# Patient Record
Sex: FEMALE | Race: OTHER
[De-identification: names, ages, dates, MRNs, and addresses within clinical notes are randomized per-mention and may not be internally consistent; named-entity substitution may affect disease eponyms.]

---

## 2021-02-25 ENCOUNTER — HOSPITAL ENCOUNTER (EMERGENCY)
Dept: HOSPITAL 63 - ER | Age: 21
LOS: 1 days | Discharge: HOME | End: 2021-02-26
Payer: OTHER GOVERNMENT

## 2021-02-25 VITALS
DIASTOLIC BLOOD PRESSURE: 90 MMHG | SYSTOLIC BLOOD PRESSURE: 118 MMHG | DIASTOLIC BLOOD PRESSURE: 90 MMHG | SYSTOLIC BLOOD PRESSURE: 118 MMHG

## 2021-02-25 VITALS — BODY MASS INDEX: 22.21 KG/M2 | HEIGHT: 64 IN | WEIGHT: 130.07 LBS

## 2021-02-25 DIAGNOSIS — R60.0: ICD-10-CM

## 2021-02-25 DIAGNOSIS — Z88.8: ICD-10-CM
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
## 2021-02-25 DIAGNOSIS — S82.491A: Primary | ICD-10-CM
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
## 2021-02-25 PROCEDURE — 73590 X-RAY EXAM OF LOWER LEG: CPT

## 2021-02-25 PROCEDURE — 29505 APPLICATION LONG LEG SPLINT: CPT

## 2021-02-25 PROCEDURE — 99284 EMERGENCY DEPT VISIT MOD MDM: CPT

## 2021-02-25 PROCEDURE — 73610 X-RAY EXAM OF ANKLE: CPT

## 2021-02-25 NOTE — RAD
Study: 

1. XR EXAM OF ANKLE_RIGHT 3VIEWS

2. CR XR Rt TIBIA+FIBULA 2 VIEWS



Indication: Ankle pain.



Comparison: None.



Findings:



AP, AP oblique and lateral views of the right ankle as well as an AP and lateral view of the right ti
mayda/fibula are obtained.



Acute spiral fracture of the distal tibial diaphysis with up to a cortical width displacement. Nondis
placed oblique fracture of the distal fibula with the caudal margin of the fracture at the level of t
he joint space. The ankle mortise is symmetric. The visualized foot osseous structures are intact. No
 acute fracture seen at the proximal tibia or fibula.



Edematous lower leg/ankle soft tissues.



Impression:



Acute spiral fracture with minimal displacement at the distal tibial diaphysis as well as an acute no
ndisplaced distal fibular fracture. No traumatic malalignment at the ankle. No acute fracture at the 
more proximal tibia or fibula.



Electronically signed by: PERLA SOUTH MD (2/25/2021 11:26 PM) [facility]

## 2021-02-25 NOTE — RAD
Study: 

1. XR EXAM OF ANKLE_RIGHT 3VIEWS

2. CR XR Rt TIBIA+FIBULA 2 VIEWS



Indication: Ankle pain.



Comparison: None.



Findings:



AP, AP oblique and lateral views of the right ankle as well as an AP and lateral view of the right ti
mayda/fibula are obtained.



Acute spiral fracture of the distal tibial diaphysis with up to a cortical width displacement. Nondis
placed oblique fracture of the distal fibula with the caudal margin of the fracture at the level of t
he joint space. The ankle mortise is symmetric. The visualized foot osseous structures are intact. No
 acute fracture seen at the proximal tibia or fibula.



Edematous lower leg/ankle soft tissues.



Impression:



Acute spiral fracture with minimal displacement at the distal tibial diaphysis as well as an acute no
ndisplaced distal fibular fracture. No traumatic malalignment at the ankle. No acute fracture at the 
more proximal tibia or fibula.



Electronically signed by: PERLA SOUTH MD (2/25/2021 11:42 PM) [facility]

## 2021-02-25 NOTE — PHYS DOC
Past History


Past Medical History:  No Pertinent History


Past Surgical History:  No Surgical History


Additional Smoking Information:  


VAPE


Alcohol Use:  Rarely





Adult General


Chief Complaint


Chief Complaint:  ANKLE PROBLEM





HPI


HPI


Patient is a 20-year-old female who presents with right ankle pain.  States she 

was rollerblading, and just finished and twisted her ankle.  States she has 

pain, 6 out of 10, dull and achy in nature with some mild swelling on the right.

 Denies any other injuries.





Review of Systems


Review of Systems


Review of systems otherwise unremarkable except noted in HPI





Allergies


Allergies





Allergies








Uncoded Allergies Type Severity Reaction Last Updated Verified


 


  COLD Allergy Unknown  2/25/21 











Physical Exam


Physical Exam





Constitutional: Well developed, well nourished, no acute distress, non-toxic a

ppearance. []


Skin: Warm, dry, no erythema, no rash. [] 


Back: No tenderness,


Extremities: Right


Neurologic: Alert and oriented X 3, normal motor function, normal sensory f

unction, no focal deficits noted. []


Psychologic: Affect normal, judgement normal, mood normal. []





Current Patient Data


Vital Signs





                                   Vital Signs








  Date Time  Temp Pulse Resp B/P (MAP) Pulse Ox O2 Delivery O2 Flow Rate FiO2


 


2/25/21 21:31 99.0 109 22 118/90 (99) 100 Room Air  











EKG


EKG


[]





Radiology/Procedures


Radiology/Procedures


[]Findings:





AP, AP oblique and lateral views of the right ankle as well as an AP and lateral

 view of the right tibia/fibula are obtained.





Acute spiral fracture of the distal tibial diaphysis with up to a cortical width

 displacement. Nondisplaced oblique fracture of the distal fibula with the 

caudal margin of the fracture at the level of the joint space. The ankle mortise

 is symmetric. The visualized foot osseous structures are intact. No acute 

fracture seen at the proximal tibia or fibula.





Edematous lower leg/ankle soft tissues.





Impression:





Acute spiral fracture with minimal displacement at the distal tibial diaphysis 

as well as an acute nondisplaced distal fibular fracture. No traumatic 

malalignment at the ankle. No acute fracture at the more proximal tibia or 

fibula.





Electronically signed by: PERLA SOUTH MD (2/25/2021 11:42 PM) [facility]





Heart Score


Risk Factors:


Risk Factors:  DM, Current or recent (<one month) smoker, HTN, HLP, family 

history of CAD, obesity.


Risk Scores:


Risk Factors:  DM, Current or recent (<one month) smoker, HTN, HLP, family 

history of CAD, obesity.





Course & Med Decision Making


Course & Med Decision Making


Patient is a 20-year-old female presents with right ankle pain


Vital signs not concerning.  Physical exam noted above.  Tylenol, ibuprofen and 

ice given.


Imaging notable for acute spiral fracture with minimal displacement at the 

distal tibial diaphysis and acute nondisplaced distal fibular fracture.  Normal 

ankle.  Discussed patient's fracture with orthopedic surgeon Dr. Hoffman at 

[place].  [place] since patients pain is controlled and was neurovascularly 

intact a posterior long-leg splint was appropriate.  Advised that she should 

follow-up in their clinic next Tuesday when he is in.


Patient placed in a posterior long-leg splint.  Given oral pain medication in 

the ED.  Given crutches.  Gave strict return precautions to the ED.  Gave 

contact information for [place] [facility] and advised to call first 

thing Monday morning to set up an appointment for Tuesday to see Dr. Hoffman.  

Patient grateful, verbalized understanding and agreed with plan of discharge.


[]





Dragon Disclaimer


Dragon Disclaimer


This electronic medical record was generated, in whole or in part, using a voice

 recognition dictation system.





Departure


Departure:


Impression:  


   Primary Impression:  


   Tibial fracture


   Additional Impression:  


   Fibula fracture


Disposition:  01 DC HOME SELF CARE/HOMELESS


Condition:  IMPROVED


Referrals:  


MONIQUE GREENFIELD DO (PCP)


Patient Instructions:  Tibial and Fibular Fracture, Adult





Additional Instructions:  


Please read all the attached information.  Please use your crutches at all times

 when walking.  Please keep your splint on.  You can use Tylenol and ice at home

 as needed for pain.  Please use your prescription pain medicine for 

breakthrough pain.





Please call the [place] [facility] at [phone] first thing Monday 

morning to set up an appointment to see Dr. Hoffman in his clinic on Tuesday.





Please come back to the emergency department with any new or concerning symptoms

 as discussed.


Scripts


Hydrocodone Bit/Acetaminophen (HYDROCODONE-APAP 5-325  **) 1 Each Tablet


1 TAB PO PRN Q6HRS PRN for fracture for 6 Days, #24 TAB 0 Refills


   Prov: DILAN REED MD         2/26/21





Problem Qualifiers











DILAN REED MD               Feb 25, 2021 21:57

## 2021-03-04 ENCOUNTER — HOSPITAL ENCOUNTER (OUTPATIENT)
Dept: HOSPITAL 61 - LAB | Age: 21
End: 2021-03-04
Attending: ORTHOPAEDIC SURGERY
Payer: OTHER GOVERNMENT

## 2021-03-04 DIAGNOSIS — Z01.812: Primary | ICD-10-CM

## 2021-03-04 DIAGNOSIS — Z20.822: ICD-10-CM

## 2021-03-04 PROCEDURE — U0003 INFECTIOUS AGENT DETECTION BY NUCLEIC ACID (DNA OR RNA); SEVERE ACUTE RESPIRATORY SYNDROME CORONAVIRUS 2 (SARS-COV-2) (CORONAVIRUS DISEASE [COVID-19]), AMPLIFIED PROBE TECHNIQUE, MAKING USE OF HIGH THROUGHPUT TECHNOLOGIES AS DESCRIBED BY CMS-2020-01-R: HCPCS

## 2021-03-08 ENCOUNTER — HOSPITAL ENCOUNTER (OUTPATIENT)
Dept: HOSPITAL 61 - SURG | Age: 21
Discharge: HOME | End: 2021-03-08
Attending: ORTHOPAEDIC SURGERY
Payer: OTHER GOVERNMENT

## 2021-03-08 VITALS — WEIGHT: 147 LBS | HEIGHT: 63 IN | BODY MASS INDEX: 26.05 KG/M2

## 2021-03-08 VITALS — DIASTOLIC BLOOD PRESSURE: 78 MMHG | SYSTOLIC BLOOD PRESSURE: 158 MMHG

## 2021-03-08 DIAGNOSIS — Z79.899: ICD-10-CM

## 2021-03-08 DIAGNOSIS — S82.441A: ICD-10-CM

## 2021-03-08 DIAGNOSIS — S82.241A: Primary | ICD-10-CM
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
## 2021-03-08 DIAGNOSIS — E66.9: ICD-10-CM

## 2021-03-08 DIAGNOSIS — Z98.890: ICD-10-CM
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
## 2021-03-08 DIAGNOSIS — Z90.49: ICD-10-CM
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
## 2021-03-08 PROCEDURE — A4930 STERILE, GLOVES PER PAIR: HCPCS

## 2021-03-08 PROCEDURE — A4213 20+ CC SYRINGE ONLY: HCPCS

## 2021-03-08 PROCEDURE — A4364 ADHESIVE, LIQUID OR EQUAL: HCPCS

## 2021-03-08 PROCEDURE — A6253 ABSORPT DRG > 48 SQ IN W/O B: HCPCS

## 2021-03-08 PROCEDURE — A6402 STERILE GAUZE <= 16 SQ IN: HCPCS

## 2021-03-08 PROCEDURE — 27759 TREATMENT OF TIBIA FRACTURE: CPT

## 2021-03-08 PROCEDURE — 81025 URINE PREGNANCY TEST: CPT

## 2021-03-08 PROCEDURE — C1769 GUIDE WIRE: HCPCS

## 2021-03-08 PROCEDURE — C1713 ANCHOR/SCREW BN/BN,TIS/BN: HCPCS

## 2021-03-08 PROCEDURE — 76000 FLUOROSCOPY <1 HR PHYS/QHP: CPT

## 2021-03-08 PROCEDURE — A4657 SYRINGE W/WO NEEDLE: HCPCS

## 2021-03-08 PROCEDURE — A6450 LT COMPRES BAND >=5"/YD: HCPCS

## 2021-03-08 RX ADMIN — HYDROMORPHONE HYDROCHLORIDE PRN MG: 2 INJECTION INTRAMUSCULAR; INTRAVENOUS; SUBCUTANEOUS at 15:52

## 2021-03-08 RX ADMIN — HYDROMORPHONE HYDROCHLORIDE PRN MG: 2 INJECTION INTRAMUSCULAR; INTRAVENOUS; SUBCUTANEOUS at 16:08

## 2021-03-08 RX ADMIN — PROCHLORPERAZINE EDISYLATE PRN MG: 5 INJECTION INTRAMUSCULAR; INTRAVENOUS at 15:19

## 2021-03-08 RX ADMIN — PROCHLORPERAZINE EDISYLATE PRN MG: 5 INJECTION INTRAMUSCULAR; INTRAVENOUS at 15:28

## 2021-03-08 NOTE — PDOC4
Operative Note


Operative Note


Date of Procedure:  March 8, 2021


Pre-Op Diagnosis: Closed displaced spiral fracture of shaft of right tibia, 

initial encounter - S82.241A 


Post-Op Diagnosis:  Same


Procedure:  Treatment of tibial shaft fracture (and associated fibula fracture) 

by intramedullary implant with interlocking screws CPT 46998


Surgeon: Sondra Hoffman MD


Assistant: TRACI Silveira


Anesthesia:  General


EBL:  200 mL


Specimens Obtained:  none


Complications:  none


Drains: none


Tourniquet: 17 minutes at 250 mmHg


Implants: Denisha T2 Alpha tibial nail system 9 mm x 315 mm, Locking screws 5x35

mm, 5x32.5 mm, 5x40 mm, 5x42.5 mm, end cap 11.5 mm +20 mm.





Indications for Procedure: The patient is a 20 year old who fractured her tibia 

and fibula while rollerskating.   X-rays showed a displaced tibial shaft fract

ure and associated fibular shaft fracture also displaced with no apparent ankle 

joint disruption. The injury was closed.  Initially we tried nonoperative 

treatment for a few days due to minimal displacement, but the fracture was too 

unstable, and became more displaced and rotated.  I recommended intramedullary 

nailing. We discussed the risks and benefits. We discussed potential risks of 

infection, blood clots, malunion or nonunion, need for hardware removal, 

compartment syndrome, or other potential surgical or anesthetic complications. 

All of her questions about surgery were answered and she desired to proceed. A 

written consent was obtained.





Procedure in Detail: The patient was identified in the preoperative holding 

area.  The correct left lower extremity was marked by me.  The patient was taken

to the operating room where general anesthesia was used.  The patient was 

positioned supine on the operating table. Preoperative antibiotics were given 

intravenously.  A tourniquet was used on the upper thigh.  A timeout procedure 

was performed. The limb was prepared in sterile fashion with surgical prep 

solution. Sterile drapes were applied. A Coban was placed over the foot and 

toes.





The large image intensifier was used throughout the procedure, and all the 

images were interpreted intraoperatively by me. A preliminary reduction was 

performed and the reduction maintained with traction, angulation and rotation by

my assistant when reaming was performed.





An Esmarch bandage was used to exsanguinate the limb and the tourniquet was 

inflated.  A longitudinal incision was used over the patellar tendon. The 

patella tendon was retracted laterally. Bovie electrocautery was used for 

hemostasis. The entry Awl was used to enter the proximal tibia, and the entry 

point was confirmed in the AP and lateral planes on the image intensifier. A 

beaded guidewire was placed. My assistant held the reduction, and the guidewire 

was placed across the fracture site. The guidewire was measured for length. 





The tourniquet was released.  Sequential reaming was performed over the 

guidewire with the fracture held reduced by my assistant, and using the tissue 

protector. I reamed up to size 10.5 mm for the 9 mm nail, and had intramedullary

chatter at the isthmus with the 10 mm reamer. The 9 mm x 315 mm nail was chosen 

and opened. Copious saline irrigation was used.





The nail was attached to the nail adapter and strike plate, and placed down the 

intramedullary canal without difficulty. Mallet blows were used for final 

placement, confirmed on the image intensifier proximally and distally. The 

targeting arm was applied and then 2 transverse cross lock screws were placed 

through the targeting arm. Length of the screws and positioned in the nail was 

confirmed with the image intensifier.





The nail adapter was removed by loosening the nail holding screw, and the 

targeting arm was also removed. The image intensifier showed excellent reduction

and alignment.





The distal cross locking screws were placed using a freehand technique, and the 

image intensifier. I placed 2 medial to lateral screws. Biplanar image 

intensifier view showed satisfactory hardware placement and satisfactory 

reduction of the fracture. Copious irrigation was used.





The incisions were closed with #1 Vicryl in the peripatellar fascia. #2-0 Vicryl

was used in the subcutaneous cutaneous tissues by my assistant. Staples were 

placed in the skin. 30 mL of  0.25% bupivacaine with epinephrine was injected 

into the skin edges. Xeroform and sterile dressings were applied. Needle and 

sponge counts were correct. There were no apparent complications.











SONDRA HOFFMAN MD                  Mar 8, 2021 14:57

## 2021-05-27 ENCOUNTER — HOSPITAL ENCOUNTER (OUTPATIENT)
Dept: HOSPITAL 63 - DXRAD | Age: 21
End: 2021-05-27
Payer: OTHER GOVERNMENT
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
## 2021-05-27 DIAGNOSIS — S82.241D: Primary | ICD-10-CM

## 2021-05-27 PROCEDURE — 73590 X-RAY EXAM OF LOWER LEG: CPT

## 2021-05-27 NOTE — RAD
EXAM: Right tibia and fibula, 2 views.



HISTORY: Fracture.



COMPARISON: 2/25/2021



FINDINGS: 2 views of the right tibia and fibular obtained. There is internal fixation of a distal tib
ial metadiaphyseal fracture within intramedullary josey. There has been interval healing along the frac
ture line compared to the prior exam. There has also been slight interval healing of a mildly displac
ed distal fibular metadiaphyseal fracture.



IMPRESSION: 

1. Healing distal tibial fracture status post internal fixation.

2. Healing distal fibular fracture.



Electronically signed by: Arleth Evans MD (5/27/2021 3:08 PM) [id number]

## 2021-12-31 ENCOUNTER — HOSPITAL ENCOUNTER (EMERGENCY)
Dept: HOSPITAL 63 - ER | Age: 21
Discharge: HOME | End: 2021-12-31
Payer: OTHER GOVERNMENT

## 2021-12-31 VITALS — SYSTOLIC BLOOD PRESSURE: 97 MMHG | DIASTOLIC BLOOD PRESSURE: 62 MMHG

## 2021-12-31 VITALS — WEIGHT: 164.24 LBS | HEIGHT: 63 IN | BODY MASS INDEX: 29.1 KG/M2

## 2021-12-31 DIAGNOSIS — B34.9: ICD-10-CM

## 2021-12-31 DIAGNOSIS — U07.1: Primary | ICD-10-CM

## 2021-12-31 DIAGNOSIS — K21.9: ICD-10-CM

## 2021-12-31 DIAGNOSIS — Z88.8: ICD-10-CM

## 2021-12-31 LAB
INFLUENZA A PATIENT: NEGATIVE
INFLUENZA B PATIENT: NEGATIVE

## 2021-12-31 PROCEDURE — 87426 SARSCOV CORONAVIRUS AG IA: CPT

## 2021-12-31 PROCEDURE — 87804 INFLUENZA ASSAY W/OPTIC: CPT

## 2021-12-31 PROCEDURE — 99283 EMERGENCY DEPT VISIT LOW MDM: CPT

## 2021-12-31 NOTE — PHYS DOC
Past History


Past Medical History:  GERD


Additional Past Medical Histor:  cold urticaria


 (CATRACHITO CASTRO)


Additional Past Surgical Histo:  L knee


 (CATRACHITO CASTRO)


Alcohol Use:  None


 (CATRACHITO CASTRO)





General Adult


EDM:


Chief Complaint:  NAUSEA/VOMITING/DIARRHEA





HPI:


HPI:





Patient is a 21 year old female who presents with 4-day history of headache, 

body aches, nasal congestion and cough.  Patient states her symptoms started on 

Tuesday night with a headache, and the other symptoms have developed since.  

Last night, her temperature was 101 F.  She also reports associated nausea and 

vomiting.  However, she does state that she has had issues with acid reflux in 

the past and has been told that she needs an endoscopy, but has not had one yet.

 She states that Tums relieve her epigastric discomfort.  She took Tylenol 

severe cold and flu prior to arrival.  Patient denies focal weakness, 

paresthesias, chills, sore throat, shortness of breath, diarrhea and 

constipation.


 (CATRACHITO CASTRO)





Review of Systems:


Review of Systems:


ROS negative or noncontributory except as mentioned in HPI.


 (CATRACHITO CASTRO)





Allergies:


Allergies:





Allergies








Uncoded Allergies Type Severity Reaction Last Updated Verified


 


  COLD Allergy Unknown  2/25/21 








 (CATRACHITO CASTRO)





Physical Exam:


PE:





Constitutional: Well developed, well nourished, no acute distress, non-toxic 

appearance. 


HENT: Normocephalic, atraumatic, bilateral external ears without deformity or 

discharge, oropharynx moist, no oral exudates, nose without deformity or 

discharge.


Eyes: PERRLA, EOMI, conjunctiva normal, no discharge. 


Neck: Normal range of motion, no tenderness, supple, no stridor. 


Cardiovascular: Heart rate regular rhythm, no murmur.


Lungs & Thorax: Symmetrical breath sounds without wheezes, rales or rhonchi 

bilaterally.


Abdomen: Bowel sounds normal, soft, mild epigastric tenderness, no masses, no 

pulsatile masses.


Skin: Warm, dry, no erythema, no rash.


 (CATRACHITO CASTRO)





Current Patient Data:


Vital Signs:





                                   Vital Signs








  Date Time  Temp Pulse Resp B/P (MAP) Pulse Ox O2 Delivery O2 Flow Rate FiO2


 


12/31/21 12:26 98.0 98 18 111/74 (86) 99   








 (CATRACHITO CASTRO)





Heart Score:


C/O Chest Pain:  No


 (CATRACHITO CASTRO)





Course & Med Decision Making:


Course & Med Decision Making


Pertinent Labs and Imaging studies reviewed. (See chart for details)





Patient is a generally healthy 21-year-old female who presents with multiple 

symptoms concerning for viral illness, including COVID-19.  Influenza A&B and 

COVID-19 swabs obtained.  Patient counseled on supportive treatment and need for

follow-up for her chronic GI symptoms.  Patient understands and is agreeable to 

discharge plan.


 (CATRACHITO CASTRO)





Dragon Disclaimer:


Dragon Disclaimer:


This electronic medical record was generated, in whole or in part, using a voice

recognition dictation system.


 (CATRACHITO CASTRO)


Attending Co-Sign


The patient was seen and interviewed as well as examined at the bedside. The c

zamora was reviewed. The case was discussed. Agree with the plan of care.


 (SVITLANA SMITH DO)





Departure


Departure:


Impression:  


   Primary Impression:  


   Viral syndrome


   Additional Impression:  


   Person under investigation for COVID-19


Disposition:  01 HOME / SELF CARE / HOMELESS


Condition:  STABLE


Referrals:  


MONIQUE GREENFIELD DO (PCP)








XAVIER ARCE MD


Patient Instructions:  Diet for Gastroesophageal Reflux Disease, Adult, 

Easy-to-Read, Viral Syndrome





Additional Instructions:  


You were provided with contact information above for gastroenterology (Dr. Arce).  You may contact them at your convenience for further evaluation and 

management of your abdominal discomfort.  Continue taking Tums as needed for 

symptom relief.  You were also prescribed dissolvable Zofran for nausea and 

vomiting.  Take over-the-counter Mucinex (guaifenesin) for congestion.  Sleeping

with a cool-mist humidifier by your bed at night may help in getting rid of con

gestion as well.  Take Tessalon Perles (benzonatate) before bed at night to help

with your cough.  Alternating between acetaminophen and Tylenol every 4 hours 

will help with your fever, headache and body aches.





You have been tested for COVID-19. It is an infection caused by a new type of 

coronavirus. COVID-19 will cause cold-like or mild flu symptoms in most. It can 

cause more severe symptoms like problems breathing in some. There is no 

treatment for COVID-19. The body will clear the infection over time. Self-care 

will help to ease discomfort.





Steps to Take:


 - Rest as needed. 


 - Choose healthy foods including fruits and vegetables. Drink water throughout 

the day.


 - Get plenty of sleep each night.


 - If you smoke, try to quit. It may ease breathing.


 - Avoid alcohol.


 - Keep Others Healthy


 - The virus can spread to others. Droplets are released every time you sneeze 

or cough. The droplets can get into the mouth, nose, or eyes of people near you 

and lead to infection. To lower the chances of spreading COVID-19 to others:





Stay at home until your doctor has said it is safe to leave. If you tested 

positive this will mean staying isolated until both of the following are true:


 - At least 10 days have passed since the start of illness.


 - You are free of fever for at least 72 hours without the use of medicine.





During this time:


 - Avoid public areas, events, or transportation. Do not return to work or 

school until your doctor has said it is safe to do so.


 - Call ahead if you need to go to a medical center. Let them know you may have 

COVID-19. It will help them guide you where to go. They may also ask you to wear

a facemask when you come to the office.


 - If you call for emergency medical services, let them know you may have COVID-

19.





While at home:


 - Try to avoid close contact with others. Stay about 6 feet away.


 - If possible, spend most of your time in a separate room from others.


 - Use a face mask if you will be in close contact with others such as sharing a

room or vehicle.


 - Have someone wipe down common surfaces in the home. Use household  

every day on areas like doorknobs, counters, or sinks.


 - Cough or sneeze into a tissue. Throw the tissue away right after use. If a 

tissue is not available, cough or sneeze into your elbow.


 - Wash your hands often. Wash them after sneezing or coughing. Use soap and 

water and wash or at least 20 seconds. Alcohol based hand  can be used if

soap and water is not available.


 - Do not prepare food for others. Avoid sharing personal items like forks, 

spoons, or toothbrushes.


 - Avoid close contact with pets while you are sick. There is no evidence of the

virus passing to pets. This is a safety step until more is known about this 

virus.


 - Isolation can be frustrating. Social interaction can help. Keep in touch with

friends and family through phone and tech options. You can still interact with 

others in your home, just keep a safe distance of about 6 feet.





Follow-up:


 - Your doctors office will check in with you to see if there are any changes 

in your health. 


 - You may be asked to keep track of symptoms to share with them. They will also

let you know when you are clear to be in public again.





Contact your doctor if your recovery is not going as you expect. Get emergency 

care if you have problems such as:


 - Trouble breathing with oxygen saturation <90%


 - Nonstop chest pain or pressure


 - Changes in awareness, confusion, or problems waking


 - Lips or face have bluish color


 - Worsening of symptoms





If you think you have an emergency, call for emergency medical services right 

away.





As taken from [email or web address]


Scripts


Benzonatate (BENZONATATE) 100 Mg Capsule


1-2 CAP PO HS for cough, #20 CAP


   Prov: CATRACHITO CASTRO         12/31/21 


Ondansetron (ONDANSETRON ODT) 4 Mg Tab.rapdis


1-2 TAB PO PRN Q6-8HRS for n/v, #20 TAB


   Prov: CATRACHITO CASTRO         12/31/21











CATRACHITO CASTRO              Dec 31, 2021 13:02


SVITLANA SMITH DO                  Jan 1, 2022 22:18